# Patient Record
Sex: MALE | URBAN - METROPOLITAN AREA
[De-identification: names, ages, dates, MRNs, and addresses within clinical notes are randomized per-mention and may not be internally consistent; named-entity substitution may affect disease eponyms.]

---

## 2018-10-02 ENCOUNTER — APPOINTMENT (RX ONLY)
Dept: URBAN - METROPOLITAN AREA CLINIC 114 | Facility: CLINIC | Age: 43
Setting detail: DERMATOLOGY
End: 2018-10-02

## 2018-10-02 VITALS — HEIGHT: 66 IN | WEIGHT: 230 LBS

## 2018-10-02 DIAGNOSIS — L91.8 OTHER HYPERTROPHIC DISORDERS OF THE SKIN: ICD-10-CM

## 2018-10-02 DIAGNOSIS — D22 MELANOCYTIC NEVI: ICD-10-CM

## 2018-10-02 DIAGNOSIS — B07.8 OTHER VIRAL WARTS: ICD-10-CM

## 2018-10-02 PROBLEM — L81.3 CAFÉ AU LAIT SPOTS: Status: ACTIVE | Noted: 2018-10-02

## 2018-10-02 PROBLEM — B36.0 PITYRIASIS VERSICOLOR: Status: ACTIVE | Noted: 2018-10-02

## 2018-10-02 PROBLEM — D22.5 MELANOCYTIC NEVI OF TRUNK: Status: ACTIVE | Noted: 2018-10-02

## 2018-10-02 PROBLEM — L91.9 HYPERTROPHIC DISORDER OF THE SKIN, UNSPECIFIED: Status: ACTIVE | Noted: 2018-10-02

## 2018-10-02 PROBLEM — L82.1 OTHER SEBORRHEIC KERATOSIS: Status: ACTIVE | Noted: 2018-10-02

## 2018-10-02 PROCEDURE — 17110 DESTRUCTION B9 LES UP TO 14: CPT

## 2018-10-02 PROCEDURE — ? COUNSELING

## 2018-10-02 PROCEDURE — ? BENIGN DESTRUCTION

## 2018-10-02 PROCEDURE — ? SHAVE REMOVAL (NO PATHOLOGY)

## 2018-10-02 PROCEDURE — 11301 SHAVE SKIN LESION 0.6-1.0 CM: CPT | Mod: 59

## 2018-10-02 PROCEDURE — 99213 OFFICE O/P EST LOW 20 MIN: CPT | Mod: 25

## 2018-10-02 ASSESSMENT — LOCATION SIMPLE DESCRIPTION DERM
LOCATION SIMPLE: RIGHT EAR
LOCATION SIMPLE: RIGHT ANTERIOR NECK
LOCATION SIMPLE: RIGHT UPPER BACK
LOCATION SIMPLE: LEFT ANTERIOR NECK

## 2018-10-02 ASSESSMENT — LOCATION ZONE DERM
LOCATION ZONE: NECK
LOCATION ZONE: TRUNK
LOCATION ZONE: EAR

## 2018-10-02 ASSESSMENT — LOCATION DETAILED DESCRIPTION DERM
LOCATION DETAILED: RIGHT MEDIAL UPPER BACK
LOCATION DETAILED: LEFT CLAVICULAR NECK
LOCATION DETAILED: RIGHT INFERIOR POSTERIOR HELIX
LOCATION DETAILED: RIGHT POSTERIOR EAR
LOCATION DETAILED: RIGHT CLAVICULAR NECK

## 2018-10-02 NOTE — PROCEDURE: BENIGN DESTRUCTION
Medical Necessity Clause: This procedure was medically necessary because the lesions that were treated were:
Add 52 Modifier (Optional): no
Consent: The patient's consent was obtained including but not limited to risks of crusting, scabbing, blistering, scarring, darker or lighter pigmentary change, recurrence, incomplete removal and infection.
Post-Care Instructions: I reviewed with the patient in detail post-care instructions. Patient is to wear sunprotection, and avoid picking at any of the treated lesions. Pt may apply Vaseline to crusted or scabbing areas.
Treatment Number (Will Not Render If 0): 0
Medical Necessity Information: It is in your best interest to select a reason for this procedure from the list below. All of these items fulfill various CMS LCD requirements except the new and changing color options.
Detail Level: Detailed
Render Post-Care Instructions In Note?: yes

## 2018-10-02 NOTE — PROCEDURE: SHAVE REMOVAL (NO PATHOLOGY)
Post-Care Instructions: I reviewed with the patient in detail post-care instructions. Patient is to keep the biopsy site dry overnight, and then apply bacitracin twice daily until healed. Patient may apply hydrogen peroxide soaks to remove any crusting.
Include Z78.9 (Other Specified Conditions Influencing Health Status) As An Associated Diagnosis?: No
X Size Of Lesion In Cm (Optional): 0
Anesthesia Type: 1% lidocaine with epinephrine
Medical Necessity Clause: This procedure was medically necessary because the lesion that was treated was:irritated with shaving
Size Of Lesion In Cm: 0.8
Hemostasis: Drysol
Wound Care: Polysporin ointment
Detail Level: Detailed
Anesthesia Volume In Cc: 5
Medical Necessity Information: It is in your best interest to select a reason for this procedure from the list below. All of these items fulfill various CMS LCD requirements except the new and changing color options.
Consent was obtained from the patient. The risks and benefits to therapy were discussed in detail. Specifically, the risks of infection, scarring, bleeding, prolonged wound healing, incomplete removal, allergy to anesthesia, nerve injury and recurrence were addressed. Prior to the procedure, the treatment site was clearly identified and confirmed by the patient. All components of Universal Protocol/PAUSE Rule completed.